# Patient Record
Sex: FEMALE | Race: WHITE | URBAN - METROPOLITAN AREA
[De-identification: names, ages, dates, MRNs, and addresses within clinical notes are randomized per-mention and may not be internally consistent; named-entity substitution may affect disease eponyms.]

---

## 2021-08-30 PROBLEM — M81.0 OSTEOPOROSIS: Status: ACTIVE | Noted: 2021-08-30

## 2021-08-31 ENCOUNTER — OFFICE VISIT (OUTPATIENT)
Dept: ENDOCRINOLOGY | Age: 46
End: 2021-08-31
Payer: COMMERCIAL

## 2021-08-31 VITALS
TEMPERATURE: 98 F | WEIGHT: 130.4 LBS | HEIGHT: 60 IN | HEART RATE: 82 BPM | BODY MASS INDEX: 25.6 KG/M2 | OXYGEN SATURATION: 97 % | RESPIRATION RATE: 14 BRPM | DIASTOLIC BLOOD PRESSURE: 72 MMHG | SYSTOLIC BLOOD PRESSURE: 116 MMHG

## 2021-08-31 DIAGNOSIS — E28.319 PREMATURE MENOPAUSE: ICD-10-CM

## 2021-08-31 DIAGNOSIS — M81.0 OSTEOPOROSIS, UNSPECIFIED OSTEOPOROSIS TYPE, UNSPECIFIED PATHOLOGICAL FRACTURE PRESENCE: Primary | ICD-10-CM

## 2021-08-31 DIAGNOSIS — K21.9 GASTROESOPHAGEAL REFLUX DISEASE WITHOUT ESOPHAGITIS: ICD-10-CM

## 2021-08-31 PROCEDURE — 99205 OFFICE O/P NEW HI 60 MIN: CPT | Performed by: INTERNAL MEDICINE

## 2021-08-31 RX ORDER — IBUPROFEN 800 MG/1
1 TABLET ORAL
COMMUNITY

## 2021-08-31 RX ORDER — OMEGA-3/DHA/EPA/FISH OIL 35-113.5MG
TABLET,CHEWABLE ORAL
COMMUNITY
Start: 2021-07-06

## 2021-08-31 RX ORDER — TAMOXIFEN CITRATE 20 MG/1
TABLET ORAL
COMMUNITY
Start: 2021-06-04

## 2021-08-31 NOTE — PROGRESS NOTES
SUBJECTIVE:  Waddell Carrel is a 40 y.o. female who is being evaluated for osteoporosis. 1. Osteoporosis, unspecified osteoporosis type, unspecified pathological fracture presence  This started in 2019. Patient was diagnosed with Osteoporosis. The problem has been gradually worsening. Patient started medication in N/A. Currently patient is on: N/A. Misses N/A doses a month. 2017 had breast lumpectomy and had hysterectomy with ovaries removed. Right breast lumpectomy. [address] [place] [address] program, thus specific bone health exercises. Eats healthy  Does not eat red meat, pork. Exercises daily  Takes raw calcium 2 tablets daily, has vitamin D, K2, magnesium  On biotin gummy  Eats yogurt, cheese  No smoking, RA  Allergic to steroids  No long-term use of steroid therapy    No breast cancer in the family. Had chemo radiation for breast cancer  Paternal grandmother had osteoporosis, wrist fractures    Current complaints: weight gain, hot flashes, mild    History of obstructive symptoms: difficulty swallowing No, changes in voice/hoarseness No.  History of radiation to patient's neck: No, but had radiation to the chest for breast cancer   Resent iodine exposure: No  Family history includes goiteraunt  Family history of thyroid cancer: No    2. Premature menopause  2017 had breast lumpectomy and had hysterectomy with ovaries removed. Right breast lumpectomy. Had chemo and radiation for breast cancer    3.   GERD  Moderate, controlled    Past Medical History:   Diagnosis Date    Breast cancer [facility])      Patient Active Problem List    Diagnosis Date Noted    Premature menopause 08/31/2021    Osteoporosis 08/30/2021     Past Surgical History:   Procedure Laterality Date    BREAST ENHANCEMENT SURGERY      BREAST LUMPECTOMY      COLONOSCOPY      HYSTERECTOMY       Family History   Problem Relation Age of Onset    Cancer Father     Cancer Maternal Grandmother     Cancer Maternal Grandfather     COPD Maternal Grandfather      Social History     Socioeconomic History    Marital status:      Spouse name: None    Number of children: None    Years of education: None    Highest education level: None   Occupational History    None   Tobacco Use    Smoking status: Never Smoker    Smokeless tobacco: Never Used   Vaping Use    Vaping Use: Never used   Substance and Sexual Activity    Alcohol use: Yes     Alcohol/week: 1.0 standard drinks     Types: 1 Glasses of wine per week     Comment: per month    Drug use: Never    Sexual activity: Yes     Partners: Male   Other Topics Concern    None   Social History Narrative    None     Social Determinants of Health     Financial Resource Strain:     Difficulty of Paying Living Expenses:    Food Insecurity:     Worried About Running Out of Food in the Last Year:     Ran Out of Food in the Last Year:    Transportation Needs:     Lack of Transportation (Medical):      Lack of Transportation (Non-Medical):    Physical Activity:     Days of Exercise per Week:     Minutes of Exercise per Session:    Stress:     Feeling of Stress :    Social Connections:     Frequency of Communication with Friends and Family:     Frequency of Social Gatherings with Friends and Family:     Attends Islam Services:     Active Member of Clubs or Organizations:     Attends Club or Organization Meetings:     Marital Status:    Intimate Partner Violence:     Fear of Current or Ex-Partner:     Emotionally Abused:     Physically Abused:     Sexually Abused:      Current Outpatient Medications   Medication Sig Dispense Refill    CETIRIZINE HCL PO Take by mouth      [facility] VITAMIN B12 1000 MCG tablet TAKE 1 TABLET BY MOUTH EVERY DAY      ibuprofen (ADVIL;MOTRIN) 800 MG tablet Take 1 tablet by mouth      tamoxifen (NOLVADEX) 20 MG tablet TAKE 1 TABLET BY MOUTH EVERY DAY      Calcium-Magnesium-Vitamin D (CALCIUM MAGNESIUM PO) Take 2 tablets by mouth daily Also contains D3 & K2       No current facility-administered medications for this visit. Allergies   Allergen Reactions    Dermabond     Prednisone Other (See Comments)     Other reaction(s):  Other - comment required  Couldn't walk  Legs went numb       Family Status   Relation Name Status    Father  (Not Specified)    MGM  (Not Specified)    MGF  (Not Specified)       Review of Systems:  Constitutional: no fatigue, no fever, has recent weight gain, no recent weight loss, no changes in appetite  Eyes: no eye pain, no change in vision, no eye redness, no eye irritation, no double vision  Ears, nose, throat: has nasal congestion, no sore throat, no earache, no decrease in hearing, no hoarseness, no dry mouth, has sinus problems, no difficulty swallowing, no neck lumps, no dental problems, no mouth sores, no ringing in ears  Pulmonary: no shortness of breath, no wheezing, no dyspnea on exertion, no cough  Cardiovascular: no chest pain, no lower extremity edema, no orthopnea, no intermittent leg claudication, no palpitations  Gastrointestinal: no abdominal pain, no nausea, no vomiting, no diarrhea, no constipation, no dysphagia, has heartburn, no bloating  Genitourinary: no dysuria, no urinary incontinence, no urinary hesitancy, no urinary frequency, no feelings of urinary urgency, no nocturia  Musculoskeletal: no joint swelling, no joint stiffness, no joint pain, no muscle cramps, no muscle pain, no bone pain  Integument/Breast: no hair loss, no skin rashes, no skin lesions, no itching, no dry skin  Neurological: no numbness, no tingling, no weakness, no confusion, no headaches, no dizziness, no fainting, no tremors, no decrease in memory, no balance problems  Psychiatric: no anxiety, no depression, no insomnia  Hematologic/Lymphatic: no tendency for easy bleeding, no swollen lymph nodes, no tendency for easy bruising  Immunology: has seasonal allergies, no frequent infections, no frequent illnesses  Endocrine: no temperature TRIG  No results found for: HDL  No results found for: LDLCHOLESTEROL, LDLCALC  No results found for: LABVLDL, VLDL  No results found for: CHOLHDLRATIO  No results found for: LABMICR, [id number]  No results found for: VITD25     ASSESSMENT/PLAN:  1. Osteoporosis, unspecified osteoporosis type, unspecified pathological fracture presence  Discussed osteoporosis treatment options, risk of fractures, medications available, side effects and benefits  Obtain work-up, then reevaluate  Patient was recommended Prolia before, but she states she was not ready to started  2 years on tamoxifen  - Calcium-Magnesium-Vitamin D (CALCIUM MAGNESIUM PO); Take 2 tablets by mouth daily Also contains D3 & K2  - Vitamin D 25 Hydroxy; Future  - Electrophoresis Protein, Serum without Reflex to Immunofixation; Future  - Calcium, 24 HR Urine; Future  - PTH, Intact; Future  - Phosphorus; Future  - Magnesium; Future  - Calcium, 24 HR Urine; Future  - Creatinine Clearance, Urine, 24 HR; Future  - Creatinine Serum for Clearance; Future  - Comprehensive Metabolic Panel; Future  - T4, Free; Future  - TSH without Reflex; Future    2. Premature menopause  Calcium in diet, vitamin D supplement  - Calcium-Magnesium-Vitamin D (CALCIUM MAGNESIUM PO); Take 2 tablets by mouth daily Also contains D3 & K2  - Vitamin D 25 Hydroxy; Future  Prolia was not ready  2 years on tamoxifen    3.   GERD  Healthy diet  Reviewed and/or ordered clinical lab results Yes  Reviewed and/or ordered radiology tests Yes   Reviewed and/or ordered other diagnostic tests No  Discussed test results with performing physician No  Independently reviewed image, tracing, or specimen No  Made a decision to obtain old records No  Reviewed and summarized old records Yes   Calcium 9.2  Obtained history from other than patient No    Nhi Ferrera was counseled regarding symptoms of osteoporosis, premature menopause diagnosis, course and complications of disease if inadequately treated, side effects of medications, diagnosis, treatment options, and prognosis, risks, benefits, complications, and alternatives of treatment, labs, imaging and other studies and treatment targets and goals, risk of fractures, treatment goals, medication side effects and benefits, answered multiple questions. She understands instructions and counseling. Total time I spent for this encounter 60 minutes    Return in about 3 months (around 11/30/2021) for osteoporosis.     Electronically signed by Rebecca Jacobs MD on 9/3/2021 at 11:46 PM

## 2021-09-03 PROBLEM — K21.9 GASTROESOPHAGEAL REFLUX DISEASE WITHOUT ESOPHAGITIS: Status: ACTIVE | Noted: 2021-09-03
